# Patient Record
Sex: FEMALE | Race: WHITE | NOT HISPANIC OR LATINO | Employment: FULL TIME | URBAN - METROPOLITAN AREA
[De-identification: names, ages, dates, MRNs, and addresses within clinical notes are randomized per-mention and may not be internally consistent; named-entity substitution may affect disease eponyms.]

---

## 2023-05-02 DIAGNOSIS — G43.909 MIGRAINE WITHOUT STATUS MIGRAINOSUS, NOT INTRACTABLE, UNSPECIFIED MIGRAINE TYPE: Primary | ICD-10-CM

## 2023-06-19 ENCOUNTER — OFFICE VISIT (OUTPATIENT)
Dept: NEUROLOGY | Facility: CLINIC | Age: 48
End: 2023-06-19
Payer: COMMERCIAL

## 2023-06-19 VITALS
DIASTOLIC BLOOD PRESSURE: 95 MMHG | HEART RATE: 77 BPM | WEIGHT: 199 LBS | HEIGHT: 64 IN | SYSTOLIC BLOOD PRESSURE: 135 MMHG | BODY MASS INDEX: 33.97 KG/M2

## 2023-06-19 DIAGNOSIS — G44.209 TENSION HEADACHE: ICD-10-CM

## 2023-06-19 DIAGNOSIS — G43.909 MIGRAINE WITHOUT STATUS MIGRAINOSUS, NOT INTRACTABLE, UNSPECIFIED MIGRAINE TYPE: ICD-10-CM

## 2023-06-19 DIAGNOSIS — G43.009 MIGRAINE WITHOUT AURA AND WITHOUT STATUS MIGRAINOSUS, NOT INTRACTABLE: Primary | ICD-10-CM

## 2023-06-19 PROCEDURE — 4010F PR ACE/ARB THEARPY RXD/TAKEN: ICD-10-PCS | Mod: CPTII,S$GLB,, | Performed by: NURSE PRACTITIONER

## 2023-06-19 PROCEDURE — 1159F MED LIST DOCD IN RCRD: CPT | Mod: CPTII,S$GLB,, | Performed by: NURSE PRACTITIONER

## 2023-06-19 PROCEDURE — 1160F RVW MEDS BY RX/DR IN RCRD: CPT | Mod: CPTII,S$GLB,, | Performed by: NURSE PRACTITIONER

## 2023-06-19 PROCEDURE — 3008F BODY MASS INDEX DOCD: CPT | Mod: CPTII,S$GLB,, | Performed by: NURSE PRACTITIONER

## 2023-06-19 PROCEDURE — 3075F PR MOST RECENT SYSTOLIC BLOOD PRESS GE 130-139MM HG: ICD-10-PCS | Mod: CPTII,S$GLB,, | Performed by: NURSE PRACTITIONER

## 2023-06-19 PROCEDURE — 1160F PR REVIEW ALL MEDS BY PRESCRIBER/CLIN PHARMACIST DOCUMENTED: ICD-10-PCS | Mod: CPTII,S$GLB,, | Performed by: NURSE PRACTITIONER

## 2023-06-19 PROCEDURE — 99999 PR PBB SHADOW E&M-EST. PATIENT-LVL IV: ICD-10-PCS | Mod: PBBFAC,,, | Performed by: NURSE PRACTITIONER

## 2023-06-19 PROCEDURE — 3008F PR BODY MASS INDEX (BMI) DOCUMENTED: ICD-10-PCS | Mod: CPTII,S$GLB,, | Performed by: NURSE PRACTITIONER

## 2023-06-19 PROCEDURE — 3075F SYST BP GE 130 - 139MM HG: CPT | Mod: CPTII,S$GLB,, | Performed by: NURSE PRACTITIONER

## 2023-06-19 PROCEDURE — 4010F ACE/ARB THERAPY RXD/TAKEN: CPT | Mod: CPTII,S$GLB,, | Performed by: NURSE PRACTITIONER

## 2023-06-19 PROCEDURE — 3080F PR MOST RECENT DIASTOLIC BLOOD PRESSURE >= 90 MM HG: ICD-10-PCS | Mod: CPTII,S$GLB,, | Performed by: NURSE PRACTITIONER

## 2023-06-19 PROCEDURE — 99204 PR OFFICE/OUTPT VISIT, NEW, LEVL IV, 45-59 MIN: ICD-10-PCS | Mod: S$GLB,,, | Performed by: NURSE PRACTITIONER

## 2023-06-19 PROCEDURE — 99999 PR PBB SHADOW E&M-EST. PATIENT-LVL IV: CPT | Mod: PBBFAC,,, | Performed by: NURSE PRACTITIONER

## 2023-06-19 PROCEDURE — 99204 OFFICE O/P NEW MOD 45 MIN: CPT | Mod: S$GLB,,, | Performed by: NURSE PRACTITIONER

## 2023-06-19 PROCEDURE — 1159F PR MEDICATION LIST DOCUMENTED IN MEDICAL RECORD: ICD-10-PCS | Mod: CPTII,S$GLB,, | Performed by: NURSE PRACTITIONER

## 2023-06-19 PROCEDURE — 3080F DIAST BP >= 90 MM HG: CPT | Mod: CPTII,S$GLB,, | Performed by: NURSE PRACTITIONER

## 2023-06-19 RX ORDER — DROSPIRENONE 4 MG/1
1 TABLET, FILM COATED ORAL DAILY
COMMUNITY
Start: 2023-04-19

## 2023-06-19 RX ORDER — LISINOPRIL 20 MG/1
20 TABLET ORAL DAILY
COMMUNITY
Start: 2023-04-19

## 2023-06-19 RX ORDER — METHOTREXATE 25 MG/ML
0.6 INJECTION, SOLUTION INTRA-ARTERIAL; INTRAMUSCULAR; INTRAVENOUS
COMMUNITY
Start: 2023-05-22

## 2023-06-19 RX ORDER — ADALIMUMAB 40MG/0.4ML
40 KIT SUBCUTANEOUS
COMMUNITY
Start: 2023-06-14

## 2023-06-19 RX ORDER — CYCLOBENZAPRINE HCL 5 MG
5 TABLET ORAL 2 TIMES DAILY PRN
Qty: 20 TABLET | Refills: 0 | Status: SHIPPED | OUTPATIENT
Start: 2023-06-19

## 2023-06-19 RX ORDER — DULOXETIN HYDROCHLORIDE 60 MG/1
60 CAPSULE, DELAYED RELEASE ORAL DAILY
COMMUNITY
Start: 2023-06-11

## 2023-06-19 RX ORDER — AMLODIPINE BESYLATE 10 MG/1
10 TABLET ORAL DAILY
COMMUNITY
Start: 2023-04-19

## 2023-06-19 RX ORDER — PANTOPRAZOLE SODIUM 40 MG/1
40 TABLET, DELAYED RELEASE ORAL EVERY MORNING
COMMUNITY
Start: 2023-04-19

## 2023-06-19 RX ORDER — FAMOTIDINE 40 MG/1
40 TABLET, FILM COATED ORAL NIGHTLY
COMMUNITY
Start: 2023-04-19

## 2023-06-19 RX ORDER — TOPIRAMATE 25 MG/1
25 TABLET ORAL NIGHTLY
COMMUNITY
Start: 2023-04-19

## 2023-06-19 RX ORDER — FOLIC ACID 1 MG/1
2000 TABLET ORAL DAILY
COMMUNITY
Start: 2023-05-15

## 2023-06-19 NOTE — ASSESSMENT & PLAN NOTE
-given increased frequency and severity of migraines as well as hyperreflexia on exam, MRI brain to rule out secondary cause of headache  -she has seen improvement in her migraines with Topamax 25 mg daily.  Discussed possibility of starting CGRP verses increasing topiramate to see if it would help with some of her more mild headaches.  She would like to try Topamax 25 mg b.i.d. 1st.  Discussed potential side effects to watch for  -try Ubrelvy for rescue

## 2023-06-19 NOTE — PROGRESS NOTES
"Subjective:       Patient ID: Ana Forman is a 48 y.o. female.    Chief Complaint: Migraine (NP: Pt referred by Dr. Paris Harrison for neuro consult to evaluate for migraines; Pt states has had migraines for years currently on topiramate 25 mg daily denies migraines starting top 4/2023; pain location around her head it is pressure symptoms nausea/light)      History of Present Illness:  New patient referred by Dr. Harrison to evaluate for headaches.  48-year-old woman with a history of RA on methotrexate and Humira.  She says she has had headaches essentially her entire life.  The most common type of headache she has dealt with in her life is what she calls her "regular headaches."  These headaches typically start in the bioccipital region or in the bilateral temporal region and radiate to the bifrontal region.  They are a pounding sensation and she describes them more as annoying rather than severely painful.  She sometimes has photophobia with these headaches, but no other associated features.  The 2nd type of head pain she has occurs when she lays down at night and relaxes for the day.  She says it feels like once her head and neck completely relax, she gets a shooting pain midline that starts at the base of her head and shoots upward.  This lasts for a couple of minutes and then resolves independently.  The 3rd type of head pain is her migraine headache pain.  She says she always wakes up with the head pain if she has a migraine.  She has a severe pain she describes as a vice .  She says the pain is so severe her head feels heavy and it is painful to lift her head up.  She has associated photophobia, nausea, and vomiting.  She typically has to lay down in a dark room for these headaches.  She is never seen a neurologist prior to this.  She thinks she had brain imaging many years ago, but nothing since her migraines became more frequent.  She is unsure the exact timeline of when she began " "to have more frequent migraines, but does note that it was definitely sometimes around perimenopause.  She was getting severe migraines frequently the week before menses in the week after menses.  They would last 2-3 days at a time.  She has tried Imitrex which was not helpful for her.  Her gyn tried to start her on combination OCPs, but those seemed to make things worse.  Ultimately, in April, her gyn started her on progesterone only birth control and her PCP started her on Topamax 25 mg daily.  Since that time, she has only had 1 migraine and it was less severe than her historical migraines.  This migraine lasted about 2 days.  She has also noticed a decrease in her more "regular" headaches.  She gets those headaches about 1-2 times per week and they last all day, but these are not really bothersome to her.  She says she is been dealing with them her whole life and are much more tolerable than the migraines.  She feels she is tolerating the topiramate 25 mg daily very well and denies any side effects.  She denies any aura activity with her migraines and denies any complex features including focal paresthesias or focal weakness.  She does note that she has an "pinched nerve" in her neck from a bone spur.  This was diagnosed by x-ray many years ago.  She is been on Cymbalta for this pinched nerve for several years and has never noticed any improvement in her migraines.    Family history of migraines in her sister who is also a patient of ours.      Review of Systems  I have reviewed a 12 point review of systems which is scanned into the chart        Past Medical History:   Diagnosis Date    Hypertension     Migraines     Reflux esophagitis     Rheumatoid arthritis, unspecified        Past Surgical History:   Procedure Laterality Date    BUNIONECTOMY      CHOLECYSTECTOMY      HEMORRHOID SURGERY      NOSE SURGERY      TONSILLECTOMY AND ADENOIDECTOMY          Family History   Problem Relation Age of Onset    Diabetes " "Mother     COPD Mother     Arthritis Mother     Hypertension Mother     Vesicoureteral reflux Mother     Kidney disease Father     Hypertension Father     Migraines Sister     Hypertension Sister     Vesicoureteral reflux Sister     Pacemaker/defibrilator Sister     Fibromyalgia Sister     Hypertension Brother         Social History     Socioeconomic History    Marital status: Unknown   Tobacco Use    Smoking status: Never    Smokeless tobacco: Never   Substance and Sexual Activity    Alcohol use: Not Currently    Drug use: Never        Outpatient Encounter Medications as of 6/19/2023   Medication Sig Dispense Refill    amLODIPine (NORVASC) 10 MG tablet Take 10 mg by mouth Daily.      DULoxetine (CYMBALTA) 60 MG capsule Take 60 mg by mouth Daily.      famotidine (PEPCID) 40 MG tablet Take 40 mg by mouth every evening.      folic acid (FOLVITE) 1 MG tablet Take 2,000 mcg by mouth Daily.      HUMIRA,CF, PEN 40 mg/0.4 mL PnKt Inject 40 mg into the skin every 14 (fourteen) days.      lisinopriL (PRINIVIL,ZESTRIL) 20 MG tablet Take 20 mg by mouth Daily.      methotrexate 25 mg/mL injection Inject 0.6 mLs into the skin every 7 days.      pantoprazole (PROTONIX) 40 MG tablet Take 40 mg by mouth every morning.      SLYND 4 mg (28) Tab Take 1 tablet by mouth Daily.      topiramate (TOPAMAX) 25 MG tablet Take 25 mg by mouth every evening.      cyclobenzaprine (FLEXERIL) 5 MG tablet Take 1 tablet (5 mg total) by mouth 2 (two) times daily as needed (tension headache). 20 tablet 0    ubrogepant (UBRELVY) 100 mg tablet Take 1 tablet (100 mg total) by mouth daily as needed for Migraine (can repeat dose in 2 hour if needed, do not exceed 200 mg in 24 hours). 8 tablet 5     No facility-administered encounter medications on file as of 6/19/2023.      Objective:   BP (!) 135/95   Pulse 77   Ht 5' 4" (1.626 m)   Wt 90.3 kg (199 lb)   BMI 34.16 kg/m²        Physical Exam  General:  Alert and oriented  NAD  No overt " edema    Cognition and Comprehension:  Speech and language intact  Follows commands    Cranial nerves:   CN 2_ Visual fields (full to confrontation both eyes)  CN 3, 4, 6_ Intact, RACHAEL, no nystagmus  CN 5_facial tactile sensation intact  CN 7_no face asymmetry; normal eye closure and smile  CN 8_hearing intact to spoken voice  CN 9, 10, 11_voice normal, shoulder shrug ok; deltoids not fatigable   CN 12 tongue_protrudes mid line    Muscle Strength and Tone:  Normal upper extremity tone  Normal lower extremity tone  Normal upper extremity strength  Normal lower extremity strength    Reflexes:  Globally brisk, symmetric  Downgoing toes    Sensation:  Intact to light touch and temperature    Coordination and Gait:  Coordination and gait are normal   No ataxia with FTN or HKS      Assessment & Plan:      1. Migraine without aura and without status migrainosus, not intractable  Overview:  Started having headaches as a child.  She would have periodic migraines, but migraines became much more severe when she was perimenopausal.  Migraines are a vice  pain that are severe in nature with associated photophobia, nausea, vomiting.  She often has to lay down in a dark room.  She has tried Cymbalta and Imitrex in the past with no relief.  She is now on Topamax 25 mg daily.    Assessment & Plan:  -given increased frequency and severity of migraines as well as hyperreflexia on exam, MRI brain to rule out secondary cause of headache  -she has seen improvement in her migraines with Topamax 25 mg daily.  Discussed possibility of starting CGRP verses increasing topiramate to see if it would help with some of her more mild headaches.  She would like to try Topamax 25 mg b.i.d. 1st.  Discussed potential side effects to watch for  -try Ubrelvy for rescue    Orders:  -     MRI Brain W WO Contrast; Future; Expected date: 06/19/2023  -     ubrogepant (UBRELVY) 100 mg tablet; Take 1 tablet (100 mg total) by mouth daily as needed for  Migraine (can repeat dose in 2 hour if needed, do not exceed 200 mg in 24 hours).  Dispense: 8 tablet; Refill: 5    2. Migraine without status migrainosus, not intractable, unspecified migraine type  Overview:  Started having headaches as a child.  She would have periodic migraines, but migraines became much more severe when she was perimenopausal.  Migraines are a vice  pain that are severe in nature with associated photophobia, nausea, vomiting.  She often has to lay down in a dark room.  She has tried Cymbalta and Imitrex in the past with no relief.  She is now on Topamax 25 mg daily.    Assessment & Plan:  -given increased frequency and severity of migraines as well as hyperreflexia on exam, MRI brain to rule out secondary cause of headache  -she has seen improvement in her migraines with Topamax 25 mg daily.  Discussed possibility of starting CGRP verses increasing topiramate to see if it would help with some of her more mild headaches.  She would like to try Topamax 25 mg b.i.d. 1st.  Discussed potential side effects to watch for  -try Ubrelvy for rescue    Orders:  -     Ambulatory referral/consult to Neurology    3. Tension headache  -     cyclobenzaprine (FLEXERIL) 5 MG tablet; Take 1 tablet (5 mg total) by mouth 2 (two) times daily as needed (tension headache).  Dispense: 20 tablet; Refill: 0          This note was created with the assistance of voice recognition software. There may be transcription errors as a result of using this technology however minimal. Effort has been made to assure accuracy of transcription but any obvious errors or omissions should be clarified with the author of the document.

## 2023-06-28 ENCOUNTER — TELEPHONE (OUTPATIENT)
Dept: NEUROLOGY | Facility: CLINIC | Age: 48
End: 2023-06-28
Payer: COMMERCIAL

## 2023-06-28 NOTE — TELEPHONE ENCOUNTER
Pt states she was advised to notify clinic if [facility] in [place], [place] did not reach out to schedule her MRI appt in a few days from LOV.   Pt states it has been a week.   States seeking advice.     LOV: 6/19/23    NOV: 9/11/23

## 2023-06-28 NOTE — TELEPHONE ENCOUNTER
The MRI orders in the chart.  Can somebody reach out to that imaging center to see if they can call the patient to schedule

## 2023-07-06 ENCOUNTER — HOSPITAL ENCOUNTER (OUTPATIENT)
Dept: RADIOLOGY | Facility: HOSPITAL | Age: 48
Discharge: HOME | End: 2023-07-06
Attending: NURSE PRACTITIONER
Payer: COMMERCIAL

## 2023-07-06 ENCOUNTER — TELEPHONE (OUTPATIENT)
Dept: NEUROLOGY | Facility: CLINIC | Age: 48
End: 2023-07-06

## 2023-07-06 DIAGNOSIS — J32.9 CHRONIC SINUSITIS, UNSPECIFIED LOCATION: Primary | ICD-10-CM

## 2023-07-06 PROCEDURE — 70553 MRI BRAIN STEM W/O & W/DYE: CPT | Mod: TC,PN

## 2023-07-06 PROCEDURE — 25500020 PHARM REV CODE 255: Mod: PN | Performed by: NURSE PRACTITIONER

## 2023-07-06 PROCEDURE — 70553 MRI BRAIN STEM W/O & W/DYE: CPT | Mod: 26,,, | Performed by: STUDENT IN AN ORGANIZED HEALTH CARE EDUCATION/TRAINING PROGRAM

## 2023-07-06 PROCEDURE — 70553 MRI BRAIN W WO CONTRAST: ICD-10-PCS | Mod: 26,,, | Performed by: STUDENT IN AN ORGANIZED HEALTH CARE EDUCATION/TRAINING PROGRAM

## 2023-07-06 PROCEDURE — A9585 GADOBUTROL INJECTION: HCPCS | Mod: PN | Performed by: NURSE PRACTITIONER

## 2023-07-06 RX ORDER — GADOBUTROL 604.72 MG/ML
9 INJECTION INTRAVENOUS
Status: COMPLETED | OUTPATIENT
Start: 2023-07-06 | End: 2023-07-06

## 2023-07-06 RX ADMIN — GADOBUTROL 9 ML: 604.72 INJECTION INTRAVENOUS at 10:07

## 2023-07-06 NOTE — TELEPHONE ENCOUNTER
----- Message from MICA Arriaga sent at 7/6/2023 12:59 PM CDT -----  MRI brain was normal.  Nothing structural contributing to her increase in migraines.  Some sinus disease was incidentally noted.  If she has frequent sinus infections, she may want to consider ENT evaluation